# Patient Record
(demographics unavailable — no encounter records)

---

## 2024-12-10 NOTE — DISCUSSION/SUMMARY
[] : The components of the vaccine(s) to be administered today are listed in the plan of care. The disease(s) for which the vaccine(s) are intended to prevent and the risks have been discussed with the caretaker.  The risks are also included in the appropriate vaccination information statements which have been provided to the patient's caregiver.  The caregiver has given consent to vaccinate. [FreeTextEntry1] :  Sixteen year old female WELL ADOLESCENT. Continue balanced diet with all food groups. Brush teeth twice a day with toothbrush. Recommend visit to dentist. Maintain consistent daily routines and sleep schedule. Personal hygiene, puberty, and sexual health reviewed. Risky behaviors assessed. School discussed. Limit screen time to no more than 2 hours per day. Encourage physical activity. Return 1 year for routine well child check.

## 2024-12-10 NOTE — HISTORY OF PRESENT ILLNESS
[Yes] : Patient goes to dentist yearly [Toothpaste] : Primary Fluoride Source: Toothpaste [Up to date] : Up to date [Normal] : normal [LMP: _____] : LMP: [unfilled] [Cycle Length: _____ days] : Cycle Length: [unfilled] days [Days of Bleeding: _____] : Days of bleeding: [unfilled] [Age of Menarche: ____] : Age of Menarche: [unfilled] [Eats meals with family] : eats meals with family [Has family members/adults to turn to for help] : has family members/adults to turn to for help [Is permitted and is able to make independent decisions] : Is permitted and is able to make independent decisions [Sleep Concerns] : no sleep concerns [Grade: ____] : Grade: [unfilled] [Eats regular meals including adequate fruits and vegetables] : eats regular meals including adequate fruits and vegetables [Drinks non-sweetened liquids] : drinks non-sweetened liquids  [Calcium source] : calcium source [Has concerns about body or appearance] : does not have concerns about body or appearance [Has friends] : has friends [At least 1 hour of physical activity a day] : at least 1 hour of physical activity a day [Screen time (except homework) less than 2 hours a day] : screen time (except homework) less than 2 hours a day [Has interests/participates in community activities/volunteers] : does not have interests/participates in community activities/volunteers [Uses electronic nicotine delivery system] : does not use electronic nicotine delivery system [Exposure to electronic nicotine delivery system] : no exposure to electronic nicotine delivery system [Uses tobacco] : does not use tobacco [Exposure to tobacco] : no exposure to tobacco [Uses drugs] : does not use drugs  [Exposure to drugs] : no exposure to drugs [Drinks alcohol] : does not drink alcohol [Exposure to alcohol] : no exposure to alcohol [Uses safety belts/safety equipment] : uses safety belts/safety equipment  [Impaired/distracted driving] : no impaired/distracted driving [Has peer relationships free of violence] : has peer relationships free of violence [No] : Patient has not had sexual intercourse. [Has ways to cope with stress] : has ways to cope with stress [Displays self-confidence] : displays self-confidence [Has problems with sleep] : does not have problems with sleep [Gets depressed, anxious, or irritable/has mood swings] : does not get depressed, anxious, or irritable/has mood swings [Has thought about hurting self or considered suicide] : has not thought about hurting self or considered suicide [With Teen] : teen [de-identified] : Grandmother [de-identified] : Aditi ALEX [FreeTextEntry1] :  16 year female brought to the office for Well .Has been doing well, appetite is good, sleeps well, voiding and stooling normally. Growth and development is appropriate for age

## 2025-02-03 NOTE — PHYSICAL EXAM
[NL] : moves all extremities x4, warm, well perfused x4 [Erythematous] : erythematous [Flares] : flares [Maculopapular Eruption] : maculopapular eruption [Feet] : feet

## 2025-02-03 NOTE — HISTORY OF PRESENT ILLNESS
[Derm Symptoms] : DERM SYMPTOMS [Hives] : hives  [Face] : face [Extremities] : extremities [___ Day(s)] : [unfilled] day(s) [Itchy] : itchy [Migrating] : migrating [PO Antihistamine] : po antihistamine [Intermittent] : intermittent [Fever] : no fever [Sore Throat] : no sore throat [de-identified] : seen at [facility]; advised to continue antihistamine Resident

## 2025-02-03 NOTE — DISCUSSION/SUMMARY
[FreeTextEntry1] :  16 year female comes in today with mild urticaria x 2-3 days after sore throat. Pt likely with viral induced urticaria. Recommend diphenhydramine q4-6h as needed. If symptoms worsen return to office.

## 2025-05-14 NOTE — HISTORY OF PRESENT ILLNESS
[FreeTextEntry6] :  Sixteen year old female with Known history of anxiety with panic attacks who has been having more frequent episodes of symptoms. She sees a therapist weekly but is not on any medications. She is aware of the physical symptoms ( feels nauseous with tightness in stomach/ hyperventilates and at time feels numbness/ "pin and needles " on fingers). Thought that the increased episodes were related to her performance at her dance classes( was not doing well) but was reassured that she was doing well. Still gets the "attacks". Discussed other potential possibilities of the source of the panic attacks but she can't figure what brings them on.Mom wants to make sure nothing medical is the reason. We discussed her labs from last year ( extensive w/u done that was not helpful in identifying a physical reason that contributes to her symptoms)

## 2025-05-14 NOTE — DISCUSSION/SUMMARY
[FreeTextEntry1] :  Sixteen year old female with Anxiety and Panic attacks. Well aware of what to do when she has an acute panic attack ( rebreather bag ) to allieviate the physical symptoms. She sees therapist weekly. Advised to speak to therapist regarding low dose anxiolytic medications